# Patient Record
Sex: FEMALE | Race: OTHER | NOT HISPANIC OR LATINO | URBAN - METROPOLITAN AREA
[De-identification: names, ages, dates, MRNs, and addresses within clinical notes are randomized per-mention and may not be internally consistent; named-entity substitution may affect disease eponyms.]

---

## 2024-08-31 ENCOUNTER — OFFICE VISIT (OUTPATIENT)
Age: 24
End: 2024-08-31
Payer: COMMERCIAL

## 2024-08-31 VITALS
RESPIRATION RATE: 18 BRPM | OXYGEN SATURATION: 93 % | SYSTOLIC BLOOD PRESSURE: 110 MMHG | TEMPERATURE: 98.4 F | WEIGHT: 183.8 LBS | HEART RATE: 101 BPM | DIASTOLIC BLOOD PRESSURE: 82 MMHG | HEIGHT: 64 IN | BODY MASS INDEX: 31.38 KG/M2

## 2024-08-31 DIAGNOSIS — R68.89 FLU-LIKE SYMPTOMS: Primary | ICD-10-CM

## 2024-08-31 DIAGNOSIS — H65.03 NON-RECURRENT ACUTE SEROUS OTITIS MEDIA OF BOTH EARS: ICD-10-CM

## 2024-08-31 LAB
SARS-COV-2 AG UPPER RESP QL IA: NEGATIVE
VALID CONTROL: NORMAL

## 2024-08-31 PROCEDURE — 87811 SARS-COV-2 COVID19 W/OPTIC: CPT | Performed by: EMERGENCY MEDICINE

## 2024-08-31 PROCEDURE — 99203 OFFICE O/P NEW LOW 30 MIN: CPT | Performed by: EMERGENCY MEDICINE

## 2024-08-31 RX ORDER — DROSPIRENONE AND ETHINYL ESTRADIOL 0.02-3(28)
KIT ORAL
COMMUNITY
Start: 2024-08-27

## 2024-08-31 RX ORDER — PHENTERMINE HYDROCHLORIDE 37.5 MG/1
37.5 CAPSULE ORAL DAILY
COMMUNITY
Start: 2024-07-22

## 2024-08-31 RX ORDER — LEVOTHYROXINE SODIUM 25 UG/1
TABLET ORAL
COMMUNITY
Start: 2024-08-30

## 2024-08-31 RX ORDER — APREMILAST 30 MG/1
30 TABLET, FILM COATED ORAL
COMMUNITY

## 2024-08-31 RX ORDER — ALBUTEROL SULFATE 90 UG/1
2 AEROSOL, METERED RESPIRATORY (INHALATION) EVERY 6 HOURS PRN
COMMUNITY

## 2024-08-31 RX ORDER — TRAZODONE HYDROCHLORIDE 50 MG/1
50 TABLET, FILM COATED ORAL
COMMUNITY
Start: 2024-07-18

## 2024-08-31 RX ORDER — FLUTICASONE PROPIONATE 110 UG/1
1 AEROSOL, METERED RESPIRATORY (INHALATION)
COMMUNITY

## 2024-08-31 RX ORDER — PREDNISONE 10 MG/1
TABLET ORAL
Qty: 27 TABLET | Refills: 0 | Status: SHIPPED | OUTPATIENT
Start: 2024-08-31

## 2024-08-31 NOTE — PATIENT INSTRUCTIONS
You have been diagnosed with a Flu like illness and your symptoms should resolve over the next 7 to 10 days with the treatments recommended today.  If they do not, it is possible that you have developed a bacterial infection and you should return. If you were to take an antibiotic while you are still in the viral stage, you will not get better any faster, but could kill off many of the good germs in your body as well as make the germs in you resistant to the antibiotic.    Take an expectorant - guaifenesin should be the only ingredient - during the day, and the cough suppressant (ex. Robitussin DM or Tessalon) if needed at night only.   Take Zinc 25 mg every 12 hours for the next week (the dose is important so do not just take a multivitamin with zinc or an over-the-counter cold med with zinc such as Airborne or Zicam, as that will not give you the sufficient dose).    You should also take vitamin D3 5000 i.u.s per day for the next 1 week, and vitamin-C 1 g twice daily (and again dosages are important, do not think you are getting enough vitamin C just by drinking extra orange juice).  May take Flonase as discussed.  You may also take a decongestant like Sudafed, unless you have hypertension or cardiac disease.  You may take Imodium for diarrhea according to package instructions.     If you are diabetic you should adhere strictly to your diabetic diet and monitor blood sugar closely while on prednisone and you should discontinue the prednisone if blood sugar becomes significantly elevated.  Avoid nonsteroidal anti-inflammatories like Advil or Aleve while on prednisone.     Although bothersome, mucous is not necessarily a bad thing. Production of mucous is the body's way of trying to capture and flush irritants from mucosal surfaces. Yellow or green mucous does not necessarily mean you have a bacterial infection. Mucous will become more discolored over time, especially first thing in the morning, as your body's  immune system floods the mucosal surfaces with white bloods cells to try and help fight infection. This white blood cell debride can also cause mucous to be discolored. Again, using nasal saline spray frequently may help soothe and keep mucous flowing out versus getting dried, thickened and / or stuck leading to more sinus pain and pressure.      If you have a cough, please realize that a cough is not necessarily a bad thing. It is a part of your body's protection mechanism to help keep your airways clear. Phlegm may be more discolored in the morning. Please note that discolored phlegm does not necessarily mean a bacterial infection.        Follow up with PCP in 3-5 days.  Proceed to ER if symptoms worsen.    AMBULATORY CARE:   Flu-like illness is an infection caused by a virus. The flu is easily spread when an infected person coughs, sneezes, or has close contact with others. You may be able to spread the flu to others for 1 week or longer after signs or symptoms appear.   Common signs and symptoms include the following:   Fever and chills    Headaches, body aches, and muscle or joint pain    Cough, runny nose, and sore throat    Loss of appetite, nausea, vomiting, or diarrhea    Tiredness    Trouble breathing  Call 911 for any of the following:   You have trouble breathing, and your lips look purple or blue.    You have a seizure.  Seek care immediately if:   You are dizzy, or you are urinating less or not at all.     You have a headache with a stiff neck, and you feel tired or confused.    You have new pain or pressure in your chest.    Your symptoms, such as shortness of breath, vomiting, or diarrhea, get worse.     Your symptoms, such as fever and coughing, seem to get better, but then get worse.  Contact your healthcare provider if:   You have new muscle pain or weakness.    You have questions or concerns about your condition or care.  Treatment for influenza  may include any of the following:  Acetaminophen  decreases pain and fever. It is available without a doctor's order. Ask how much to take and how often to take it. Follow directions. Acetaminophen can cause liver damage if not taken correctly.    NSAIDs  such as ibuprofen, help decrease swelling, pain, and fever. This medicine is available with or without a doctor's order. NSAIDs can cause stomach bleeding or kidney problems in certain people. If you take blood thinner medicine, always ask your healthcare provider if NSAIDs are safe for you. Always read the medicine label and follow directions.    Antivirals  help fight a viral infection.  Manage your symptoms:   Rest  as much as you can to help you recover.    Drink liquids as directed  to help prevent dehydration. Ask how much liquid to drink each day and which liquids are best for you.  Prevent the spread of the flu:   Wash your hands often.  Use soap and water. Wash your hands after you use the bathroom, change a child's diapers, or sneeze. Wash your hands before you prepare or eat food. Use gel hand cleanser when soap and water are not available. Do not touch your eyes, nose, or mouth unless you have washed your hands first.       Cover your mouth when you sneeze or cough.  Cough into a tissue or the bend of your arm.    Clean shared items with a germ-killing .  Clean table surfaces, doorknobs, and light switches. Do not share towels, silverware, and dishes with people who are sick. Wash bed sheets, towels, silverware, and dishes with soap and water.     Wear a mask  over your mouth and nose if you are sick or are near anyone who is sick.     Stay away from others  if you are sick.     Influenza vaccine  helps prevent influenza (flu). Everyone older than 6 months should get a yearly influenza vaccine. Get the vaccine as soon as it is available, usually in September or October each year.  Follow up with your healthcare provider as directed:  Write down your questions so you remember to ask them during  your visits.   © 2017 [employer] Information is for End User's use only and may not be sold, redistributed or otherwise used for commercial purposes. All illustrations and images included in CareNotes® are the copyrighted property of [employer] or [employer].  The above information is an  only. It is not intended as medical advice for individual conditions or treatments. Talk to your doctor, nurse or pharmacist before following any medical regimen to see if it is safe and effective for you.  Patient Education     Serous Otitis Media Discharge Instructions   About this topic   The Eustachian tube allows fluid to drain from the middle ear. Sometimes, fluid cannot drain from the tube. This may cause an acute or chronic problem known as serous otitis media. An acute problem is one that does not last for a long time. Acute serous otitis media is often caused by an infection or allergy. A chronic problem is one that lasts for a long time. Chronic serous otitis media may happen when the tube stays blocked because the fluid is too thick to drain from the tube.  This problem may go away on its own without treatment.     What care is needed at home?   Ask your doctor what you need to do when you go home. Make sure you ask questions if you do not understand what the doctor says. This way you will know what you need to do.  If the doctor orders antibiotics, be sure to take all of them, even if you start to feel better.  Do not put anything in your ear unless it was ordered by the doctor.  You may want to take medicines like ibuprofen, naproxen, or acetaminophen to help with pain.  What follow-up care is needed?   Your doctor may ask you to make visits to the office to check on your progress. Be sure to keep these visits.  Have ear and hearing tests done regularly as ordered by your doctor.  What drugs may be needed?   Your doctor may order drugs based on what problems you are  having. The doctor may order drugs to:  Help with pain and swelling  Fight an infection   Treat an allergy  Will physical activity be limited?   Talk with your doctor about the right amount of activity for you.  What problems could happen?   Infection could come back  Loss of hearing  Problems with speech  Scarring on the eardrum  What can be done to prevent this health problem?   If you smoke, quit. Do not go near people who smoke.  Stay away from people who have colds.  If you have allergies, treat them, and try to avoid your triggers.  Wash your hands often.  If over 12 months of age, stop daytime use of a pacifier.  When do I need to call the doctor?   Your symptoms are not getting better in 2 to 3 days.  You continue to have problems hearing after 2 to 3 weeks.  You have a fever of 100.4°F (38°C) or higher or chills.  You have discharge or fluid coming from your ear.  Teach Back: Helping You Understand   The Teach Back Method helps you understand the information we are giving you. After you talk with the staff, tell them in your own words what you learned. This helps to make sure the staff has described each thing clearly. It also helps to explain things that may have been confusing. Before going home, make sure you can do these:  I can tell you about my condition.  I can tell you what may help ease my pain.  I can tell you what I will do if I have a fever, ear pain, or redness and pain behind my ear.  Last Reviewed Date   2021-06-21  Consumer Information Use and Disclaimer   This generalized information is a limited summary of diagnosis, treatment, and/or medication information. It is not meant to be comprehensive and should be used as a tool to help the user understand and/or assess potential diagnostic and treatment options. It does NOT include all information about conditions, treatments, medications, side effects, or risks that may apply to a specific patient. It is not intended to be medical advice or a  substitute for the medical advice, diagnosis, or treatment of a health care provider based on the health care provider's examination and assessment of a patient’s specific and unique circumstances. Patients must speak with a health care provider for complete information about their health, medical questions, and treatment options, including any risks or benefits regarding use of medications. This information does not endorse any treatments or medications as safe, effective, or approved for treating a specific patient. [employer] and its affiliates disclaim any warranty or liability relating to this information or the use thereof. The use of this information is governed by the Terms of Use, available at https://www.[employer].com/en/know/clinical-effectiveness-terms   Copyright   Copyright © 2024 [employer] and its affiliates and/or licensors. All rights reserved.

## 2024-08-31 NOTE — PROGRESS NOTES
[facility]        NAME: Sofía Thibodeaux is a 24 y.o. female  : 2000    MRN: [id number]  DATE: 2024  TIME: 2:44 PM    Assessment and Plan   Flu-like symptoms [R68.89]  1. Flu-like symptoms  Poct Covid 19 Rapid Antigen Test    predniSONE 10 mg tablet      2. Non-recurrent acute serous otitis media of both ears  predniSONE 10 mg tablet            Patient Instructions     Patient Instructions   You have been diagnosed with a Flu like illness and your symptoms should resolve over the next 7 to 10 days with the treatments recommended today.  If they do not, it is possible that you have developed a bacterial infection and you should return. If you were to take an antibiotic while you are still in the viral stage, you will not get better any faster, but could kill off many of the good germs in your body as well as make the germs in you resistant to the antibiotic.    Take an expectorant - guaifenesin should be the only ingredient - during the day, and the cough suppressant (ex. Robitussin DM or Tessalon) if needed at night only.   Take Zinc 25 mg every 12 hours for the next week (the dose is important so do not just take a multivitamin with zinc or an over-the-counter cold med with zinc such as Airborne or Zicam, as that will not give you the sufficient dose).    You should also take vitamin D3 5000 i.u.s per day for the next 1 week, and vitamin-C 1 g twice daily (and again dosages are important, do not think you are getting enough vitamin C just by drinking extra orange juice).  May take Flonase as discussed.  You may also take a decongestant like Sudafed, unless you have hypertension or cardiac disease.  You may take Imodium for diarrhea according to package instructions.     If you are diabetic you should adhere strictly to your diabetic diet and monitor blood sugar closely while on prednisone and you should discontinue the prednisone if blood sugar becomes significantly elevated.  Avoid  nonsteroidal anti-inflammatories like Advil or Aleve while on prednisone.     Although bothersome, mucous is not necessarily a bad thing. Production of mucous is the body's way of trying to capture and flush irritants from mucosal surfaces. Yellow or green mucous does not necessarily mean you have a bacterial infection. Mucous will become more discolored over time, especially first thing in the morning, as your body's immune system floods the mucosal surfaces with white bloods cells to try and help fight infection. This white blood cell debride can also cause mucous to be discolored. Again, using nasal saline spray frequently may help soothe and keep mucous flowing out versus getting dried, thickened and / or stuck leading to more sinus pain and pressure.      If you have a cough, please realize that a cough is not necessarily a bad thing. It is a part of your body's protection mechanism to help keep your airways clear. Phlegm may be more discolored in the morning. Please note that discolored phlegm does not necessarily mean a bacterial infection.        Follow up with PCP in 3-5 days.  Proceed to ER if symptoms worsen.    AMBULATORY CARE:   Flu-like illness is an infection caused by a virus. The flu is easily spread when an infected person coughs, sneezes, or has close contact with others. You may be able to spread the flu to others for 1 week or longer after signs or symptoms appear.   Common signs and symptoms include the following:   Fever and chills    Headaches, body aches, and muscle or joint pain    Cough, runny nose, and sore throat    Loss of appetite, nausea, vomiting, or diarrhea    Tiredness    Trouble breathing  Call 911 for any of the following:   You have trouble breathing, and your lips look purple or blue.    You have a seizure.  Seek care immediately if:   You are dizzy, or you are urinating less or not at all.     You have a headache with a stiff neck, and you feel tired or confused.    You have  new pain or pressure in your chest.    Your symptoms, such as shortness of breath, vomiting, or diarrhea, get worse.     Your symptoms, such as fever and coughing, seem to get better, but then get worse.  Contact your healthcare provider if:   You have new muscle pain or weakness.    You have questions or concerns about your condition or care.  Treatment for influenza  may include any of the following:  Acetaminophen decreases pain and fever. It is available without a doctor's order. Ask how much to take and how often to take it. Follow directions. Acetaminophen can cause liver damage if not taken correctly.    NSAIDs  such as ibuprofen, help decrease swelling, pain, and fever. This medicine is available with or without a doctor's order. NSAIDs can cause stomach bleeding or kidney problems in certain people. If you take blood thinner medicine, always ask your healthcare provider if NSAIDs are safe for you. Always read the medicine label and follow directions.    Antivirals  help fight a viral infection.  Manage your symptoms:   Rest  as much as you can to help you recover.    Drink liquids as directed  to help prevent dehydration. Ask how much liquid to drink each day and which liquids are best for you.  Prevent the spread of the flu:   Wash your hands often.  Use soap and water. Wash your hands after you use the bathroom, change a child's diapers, or sneeze. Wash your hands before you prepare or eat food. Use gel hand cleanser when soap and water are not available. Do not touch your eyes, nose, or mouth unless you have washed your hands first.       Cover your mouth when you sneeze or cough.  Cough into a tissue or the bend of your arm.    Clean shared items with a germ-killing .  Clean table surfaces, doorknobs, and light switches. Do not share towels, silverware, and dishes with people who are sick. Wash bed sheets, towels, silverware, and dishes with soap and water.     Wear a mask  over your mouth and  nose if you are sick or are near anyone who is sick.     Stay away from others  if you are sick.     Influenza vaccine  helps prevent influenza (flu). Everyone older than 6 months should get a yearly influenza vaccine. Get the vaccine as soon as it is available, usually in September or October each year.  Follow up with your healthcare provider as directed:  Write down your questions so you remember to ask them during your visits.   © 2017 [employer] Information is for End User's use only and may not be sold, redistributed or otherwise used for commercial purposes. All illustrations and images included in CareNotes® are the copyrighted property of [employer] or [employer].  The above information is an  only. It is not intended as medical advice for individual conditions or treatments. Talk to your doctor, nurse or pharmacist before following any medical regimen to see if it is safe and effective for you.  Patient Education     Serous Otitis Media Discharge Instructions   About this topic   The Eustachian tube allows fluid to drain from the middle ear. Sometimes, fluid cannot drain from the tube. This may cause an acute or chronic problem known as serous otitis media. An acute problem is one that does not last for a long time. Acute serous otitis media is often caused by an infection or allergy. A chronic problem is one that lasts for a long time. Chronic serous otitis media may happen when the tube stays blocked because the fluid is too thick to drain from the tube.  This problem may go away on its own without treatment.     What care is needed at home?   Ask your doctor what you need to do when you go home. Make sure you ask questions if you do not understand what the doctor says. This way you will know what you need to do.  If the doctor orders antibiotics, be sure to take all of them, even if you start to feel better.  Do not put anything in your ear unless it was  ordered by the doctor.  You may want to take medicines like ibuprofen, naproxen, or acetaminophen to help with pain.  What follow-up care is needed?   Your doctor may ask you to make visits to the office to check on your progress. Be sure to keep these visits.  Have ear and hearing tests done regularly as ordered by your doctor.  What drugs may be needed?   Your doctor may order drugs based on what problems you are having. The doctor may order drugs to:  Help with pain and swelling  Fight an infection   Treat an allergy  Will physical activity be limited?   Talk with your doctor about the right amount of activity for you.  What problems could happen?   Infection could come back  Loss of hearing  Problems with speech  Scarring on the eardrum  What can be done to prevent this health problem?   If you smoke, quit. Do not go near people who smoke.  Stay away from people who have colds.  If you have allergies, treat them, and try to avoid your triggers.  Wash your hands often.  If over 12 months of age, stop daytime use of a pacifier.  When do I need to call the doctor?   Your symptoms are not getting better in 2 to 3 days.  You continue to have problems hearing after 2 to 3 weeks.  You have a fever of 100.4°F (38°C) or higher or chills.  You have discharge or fluid coming from your ear.  Teach Back: Helping You Understand   The Teach Back Method helps you understand the information we are giving you. After you talk with the staff, tell them in your own words what you learned. This helps to make sure the staff has described each thing clearly. It also helps to explain things that may have been confusing. Before going home, make sure you can do these:  I can tell you about my condition.  I can tell you what may help ease my pain.  I can tell you what I will do if I have a fever, ear pain, or redness and pain behind my ear.  Last Reviewed Date   2021-06-21  Consumer Information Use and Disclaimer   This generalized  information is a limited summary of diagnosis, treatment, and/or medication information. It is not meant to be comprehensive and should be used as a tool to help the user understand and/or assess potential diagnostic and treatment options. It does NOT include all information about conditions, treatments, medications, side effects, or risks that may apply to a specific patient. It is not intended to be medical advice or a substitute for the medical advice, diagnosis, or treatment of a health care provider based on the health care provider's examination and assessment of a patient’s specific and unique circumstances. Patients must speak with a health care provider for complete information about their health, medical questions, and treatment options, including any risks or benefits regarding use of medications. This information does not endorse any treatments or medications as safe, effective, or approved for treating a specific patient. [employer] and its affiliates disclaim any warranty or liability relating to this information or the use thereof. The use of this information is governed by the Terms of Use, available at https://www.[employer].com/en/know/clinical-effectiveness-terms   Copyright   Copyright © 2024 [employer] and its affiliates and/or licensors. All rights reserved.      Follow up with PCP in 3-5 days.  Proceed to  ER if symptoms worsen.    Chief Complaint     Chief Complaint   Patient presents with    Sinusitis     Started 3 weeks ago. Cough/sinus pressure/earache/headaches. Reports using OTC medicine without relief.         History of Present Illness       Patient complains of cough and congestion waning on and off for past 3 weeks now with bilateral ear fullness,, sore throat, cough and sinus congestion with clear mucus    Sinusitis  Associated symptoms include congestion, coughing, ear pain, sinus pressure and a sore throat. Pertinent negatives include no chills or shortness of breath.        Review of Systems   Review of Systems   Constitutional:  Negative for appetite change, chills, fatigue and fever.   HENT:  Positive for congestion, ear pain, rhinorrhea, sinus pressure and sore throat. Negative for trouble swallowing and voice change.    Respiratory:  Positive for cough. Negative for chest tightness, shortness of breath and wheezing.    Cardiovascular:  Negative for chest pain.   Gastrointestinal:  Negative for nausea.   Musculoskeletal:  Negative for myalgias.         Current Medications       Current Outpatient Medications:     albuterol (PROVENTIL HFA,VENTOLIN HFA) 90 mcg/act inhaler, Inhale 2 puffs every 6 (six) hours as needed, Disp: , Rfl:     Cetirizine HCl (ZYRTEC ALLERGY PO), Take by mouth, Disp: , Rfl:     fluticasone (FLOVENT HFA) 110 MCG/ACT inhaler, Inhale 1 puff, Disp: , Rfl:     levothyroxine 25 mcg tablet, , Disp: , Rfl:     Otezla 30 MG TABS, Take 30 mg by mouth, Disp: , Rfl:     phentermine 37.5 MG capsule, Take 37.5 mg by mouth daily, Disp: , Rfl:     predniSONE 10 mg tablet, Take once daily all day's pills on this schedule 6- 6- 5- 4- 3- 2- 1, Disp: 27 tablet, Rfl: 0    traZODone (DESYREL) 50 mg tablet, Take 50 mg by mouth daily at bedtime, Disp: , Rfl:     Vestura 3-0.02 MG per tablet, , Disp: , Rfl:     Current Allergies     Allergies as of 08/31/2024 - Reviewed 08/31/2024   Allergen Reaction Noted    Amoxicillin Hives 12/06/2022            The following portions of the patient's history were reviewed and updated as appropriate: allergies, current medications, past family history, past medical history, past social history, past surgical history and problem list.     Past Medical History:   Diagnosis Date    Allergic     Depression        History reviewed. No pertinent surgical history.    Family History   Problem Relation Age of Onset    No Known Problems Mother     No Known Problems Father          Medications have been verified.        Objective   /82   Pulse 101  "  Temp 98.4 °F (36.9 °C) (Oral)   Resp 18   Ht 5' 4\" (1.626 m)   Wt 83.4 kg (183 lb 12.8 oz)   LMP 08/26/2024 (Approximate)   SpO2 93%   BMI 31.55 kg/m²        Physical Exam     Physical Exam  Vitals and nursing note reviewed.   Constitutional:       General: She is not in acute distress.     Appearance: She is well-developed. She is not ill-appearing or toxic-appearing.   HENT:      Head: Normocephalic and atraumatic.      Right Ear: External ear normal.      Left Ear: External ear normal.      Ears:      Comments: Clear effusions behind both TMs worse on the left, no erythema of TMs.     Nose: Mucosal edema and congestion present.      Mouth/Throat:      Pharynx: Posterior oropharyngeal erythema present. No oropharyngeal exudate.      Tonsils: No tonsillar abscesses.   Cardiovascular:      Rate and Rhythm: Normal rate and regular rhythm.   Pulmonary:      Effort: Pulmonary effort is normal. No respiratory distress.      Breath sounds: No wheezing, rhonchi or rales.   Musculoskeletal:      Cervical back: Neck supple.   Skin:     General: Skin is warm and dry.      Coloration: Skin is not pale.      Findings: No rash.   Neurological:      Mental Status: She is alert and oriented to person, place, and time.   Psychiatric:         Mood and Affect: Mood normal.         Behavior: Behavior normal.         Thought Content: Thought content normal.         Judgment: Judgment normal.                   "